# Patient Record
Sex: FEMALE | Race: WHITE
[De-identification: names, ages, dates, MRNs, and addresses within clinical notes are randomized per-mention and may not be internally consistent; named-entity substitution may affect disease eponyms.]

---

## 2018-03-28 ENCOUNTER — HOSPITAL ENCOUNTER (OUTPATIENT)
Dept: HOSPITAL 17 - NEPE | Age: 33
Setting detail: OBSERVATION
LOS: 1 days | Discharge: HOME | End: 2018-03-29
Attending: HOSPITALIST | Admitting: HOSPITALIST
Payer: COMMERCIAL

## 2018-03-28 VITALS
RESPIRATION RATE: 18 BRPM | OXYGEN SATURATION: 100 % | TEMPERATURE: 99.1 F | HEART RATE: 64 BPM | SYSTOLIC BLOOD PRESSURE: 147 MMHG | DIASTOLIC BLOOD PRESSURE: 79 MMHG

## 2018-03-28 VITALS
SYSTOLIC BLOOD PRESSURE: 107 MMHG | OXYGEN SATURATION: 100 % | TEMPERATURE: 98.6 F | DIASTOLIC BLOOD PRESSURE: 66 MMHG | RESPIRATION RATE: 16 BRPM | HEART RATE: 85 BPM

## 2018-03-28 VITALS
SYSTOLIC BLOOD PRESSURE: 122 MMHG | OXYGEN SATURATION: 97 % | RESPIRATION RATE: 20 BRPM | HEART RATE: 82 BPM | TEMPERATURE: 97.9 F | DIASTOLIC BLOOD PRESSURE: 71 MMHG

## 2018-03-28 VITALS
HEART RATE: 68 BPM | TEMPERATURE: 98.3 F | RESPIRATION RATE: 18 BRPM | SYSTOLIC BLOOD PRESSURE: 150 MMHG | DIASTOLIC BLOOD PRESSURE: 87 MMHG | OXYGEN SATURATION: 100 %

## 2018-03-28 VITALS
SYSTOLIC BLOOD PRESSURE: 120 MMHG | OXYGEN SATURATION: 99 % | HEART RATE: 80 BPM | DIASTOLIC BLOOD PRESSURE: 69 MMHG | RESPIRATION RATE: 16 BRPM

## 2018-03-28 VITALS
TEMPERATURE: 97.7 F | RESPIRATION RATE: 18 BRPM | HEART RATE: 60 BPM | OXYGEN SATURATION: 100 % | DIASTOLIC BLOOD PRESSURE: 70 MMHG | SYSTOLIC BLOOD PRESSURE: 143 MMHG

## 2018-03-28 VITALS — WEIGHT: 114.64 LBS | BODY MASS INDEX: 24.06 KG/M2 | HEIGHT: 58 IN

## 2018-03-28 VITALS
DIASTOLIC BLOOD PRESSURE: 65 MMHG | TEMPERATURE: 97.5 F | HEART RATE: 77 BPM | OXYGEN SATURATION: 97 % | RESPIRATION RATE: 16 BRPM | SYSTOLIC BLOOD PRESSURE: 117 MMHG

## 2018-03-28 DIAGNOSIS — K82.8: ICD-10-CM

## 2018-03-28 DIAGNOSIS — K80.12: Primary | ICD-10-CM

## 2018-03-28 DIAGNOSIS — K66.8: ICD-10-CM

## 2018-03-28 LAB
ALBUMIN SERPL-MCNC: 3.9 GM/DL (ref 3.4–5)
ALP SERPL-CCNC: 172 U/L (ref 45–117)
ALT SERPL-CCNC: 34 U/L (ref 10–53)
AST SERPL-CCNC: 23 U/L (ref 15–37)
BACTERIA #/AREA URNS HPF: (no result) /HPF
BASOPHILS # BLD AUTO: 0.2 TH/MM3 (ref 0–0.2)
BASOPHILS NFR BLD: 1 % (ref 0–2)
BILIRUB SERPL-MCNC: 0.2 MG/DL (ref 0.2–1)
BUN SERPL-MCNC: 8 MG/DL (ref 7–18)
CALCIUM SERPL-MCNC: 9.4 MG/DL (ref 8.5–10.1)
CHLORIDE SERPL-SCNC: 103 MEQ/L (ref 98–107)
COLOR UR: YELLOW
CREAT SERPL-MCNC: 0.75 MG/DL (ref 0.5–1)
EOSINOPHIL # BLD: 0.1 TH/MM3 (ref 0–0.4)
EOSINOPHIL NFR BLD: 0.4 % (ref 0–4)
ERYTHROCYTE [DISTWIDTH] IN BLOOD BY AUTOMATED COUNT: 13.1 % (ref 11.6–17.2)
GFR SERPLBLD BASED ON 1.73 SQ M-ARVRAT: 90 ML/MIN (ref 89–?)
GLUCOSE SERPL-MCNC: 99 MG/DL (ref 74–106)
GLUCOSE UR STRIP-MCNC: (no result) MG/DL
HCO3 BLD-SCNC: 27.9 MEQ/L (ref 21–32)
HCT VFR BLD CALC: 40.1 % (ref 35–46)
HGB BLD-MCNC: 13.2 GM/DL (ref 11.6–15.3)
HGB UR QL STRIP: (no result)
KETONES UR STRIP-MCNC: (no result) MG/DL
LYMPHOCYTES # BLD AUTO: 2.1 TH/MM3 (ref 1–4.8)
LYMPHOCYTES NFR BLD AUTO: 13.3 % (ref 9–44)
MCH RBC QN AUTO: 27.7 PG (ref 27–34)
MCHC RBC AUTO-ENTMCNC: 33.1 % (ref 32–36)
MCV RBC AUTO: 83.8 FL (ref 80–100)
MONOCYTE #: 0.5 TH/MM3 (ref 0–0.9)
MONOCYTES NFR BLD: 3.1 % (ref 0–8)
MUCOUS THREADS #/AREA URNS LPF: (no result) /LPF
NEUTROPHILS # BLD AUTO: 13 TH/MM3 (ref 1.8–7.7)
NEUTROPHILS NFR BLD AUTO: 82.2 % (ref 16–70)
NITRITE UR QL STRIP: (no result)
PLATELET # BLD: 260 TH/MM3 (ref 150–450)
PMV BLD AUTO: 9.2 FL (ref 7–11)
PROT SERPL-MCNC: 8.8 GM/DL (ref 6.4–8.2)
RBC # BLD AUTO: 4.78 MIL/MM3 (ref 4–5.3)
SODIUM SERPL-SCNC: 139 MEQ/L (ref 136–145)
SP GR UR STRIP: 1.02 (ref 1–1.03)
SQUAMOUS #/AREA URNS HPF: <1 /HPF (ref 0–5)
URINE LEUKOCYTE ESTERASE: (no result)
WBC # BLD AUTO: 15.8 TH/MM3 (ref 4–11)

## 2018-03-28 PROCEDURE — 96366 THER/PROPH/DIAG IV INF ADDON: CPT

## 2018-03-28 PROCEDURE — 85025 COMPLETE CBC W/AUTO DIFF WBC: CPT

## 2018-03-28 PROCEDURE — 80053 COMPREHEN METABOLIC PANEL: CPT

## 2018-03-28 PROCEDURE — 00790 ANES IPER UPR ABD NOS: CPT

## 2018-03-28 PROCEDURE — 84100 ASSAY OF PHOSPHORUS: CPT

## 2018-03-28 PROCEDURE — 83690 ASSAY OF LIPASE: CPT

## 2018-03-28 PROCEDURE — 96376 TX/PRO/DX INJ SAME DRUG ADON: CPT

## 2018-03-28 PROCEDURE — 99285 EMERGENCY DEPT VISIT HI MDM: CPT

## 2018-03-28 PROCEDURE — 88304 TISSUE EXAM BY PATHOLOGIST: CPT

## 2018-03-28 PROCEDURE — 84439 ASSAY OF FREE THYROXINE: CPT

## 2018-03-28 PROCEDURE — 83036 HEMOGLOBIN GLYCOSYLATED A1C: CPT

## 2018-03-28 PROCEDURE — 76705 ECHO EXAM OF ABDOMEN: CPT

## 2018-03-28 PROCEDURE — 82150 ASSAY OF AMYLASE: CPT

## 2018-03-28 PROCEDURE — A9537 TC99M MEBROFENIN: HCPCS

## 2018-03-28 PROCEDURE — 96361 HYDRATE IV INFUSION ADD-ON: CPT

## 2018-03-28 PROCEDURE — 96365 THER/PROPH/DIAG IV INF INIT: CPT

## 2018-03-28 PROCEDURE — 78226 HEPATOBILIARY SYSTEM IMAGING: CPT

## 2018-03-28 PROCEDURE — 84443 ASSAY THYROID STIM HORMONE: CPT

## 2018-03-28 PROCEDURE — 81001 URINALYSIS AUTO W/SCOPE: CPT

## 2018-03-28 PROCEDURE — 85610 PROTHROMBIN TIME: CPT

## 2018-03-28 PROCEDURE — 83735 ASSAY OF MAGNESIUM: CPT

## 2018-03-28 PROCEDURE — 84703 CHORIONIC GONADOTROPIN ASSAY: CPT

## 2018-03-28 PROCEDURE — 74176 CT ABD & PELVIS W/O CONTRAST: CPT

## 2018-03-28 PROCEDURE — 96375 TX/PRO/DX INJ NEW DRUG ADDON: CPT

## 2018-03-28 PROCEDURE — 47562 LAPAROSCOPIC CHOLECYSTECTOMY: CPT

## 2018-03-28 PROCEDURE — G0378 HOSPITAL OBSERVATION PER HR: HCPCS

## 2018-03-28 RX ADMIN — FAMOTIDINE SCH MG: 10 INJECTION, SOLUTION INTRAVENOUS at 12:53

## 2018-03-28 RX ADMIN — PHENYTOIN SODIUM SCH MLS/HR: 50 INJECTION INTRAMUSCULAR; INTRAVENOUS at 20:41

## 2018-03-28 RX ADMIN — STANDARDIZED SENNA CONCENTRATE AND DOCUSATE SODIUM SCH TAB: 8.6; 5 TABLET, FILM COATED ORAL at 20:46

## 2018-03-28 RX ADMIN — TAZOBACTAM SODIUM AND PIPERACILLIN SODIUM SCH MLS/HR: 375; 3 INJECTION, SOLUTION INTRAVENOUS at 20:40

## 2018-03-28 RX ADMIN — Medication SCH ML: at 09:00

## 2018-03-28 RX ADMIN — Medication SCH ML: at 20:42

## 2018-03-28 RX ADMIN — TAZOBACTAM SODIUM AND PIPERACILLIN SODIUM SCH MLS/HR: 375; 3 INJECTION, SOLUTION INTRAVENOUS at 13:37

## 2018-03-28 RX ADMIN — FAMOTIDINE SCH MG: 10 INJECTION, SOLUTION INTRAVENOUS at 21:36

## 2018-03-28 RX ADMIN — PHENYTOIN SODIUM SCH MLS/HR: 50 INJECTION INTRAMUSCULAR; INTRAVENOUS at 07:17

## 2018-03-28 NOTE — HHI.HP
__________________________________________________





[facility]


Service


[facility]


Primary Care Physician


No Primary Care Physician


Admission Diagnosis





Acute Cholecystitis


Diagnoses:  


(1) Cholelithiasis


Diagnosis:  Principal





Chief Complaint:  


Abdominal pain


Travel History


International Travel<30 Days:  No


Contact w/Intl Traveler <30 Da:  No


Traveled to Known Affected Are:  No


History of Present Illness


Patient is a 32-year-old female who presented to the emergency department with 

abdominal pain.  Patient states that she has a history of gallstones and prior 

biliary colic diagnosed via ultrasound.  Patient is having worsening right 

upper quadrant pain.  Which is referring to the epigastric area.  She has some 

sharp pain that comes and goes has had some nausea with it but no vomiting


Patient has poor oral intake.


Denies any urinary symptoms.


Denies any fever chills or sweats








We will consult gastroenterology and surgery and placed in observation





Review of Systems


Constitutional:  COMPLAINS OF: Change in appetite, DENIES: Diaphoretic episodes

, Fatigue, Fever, Weight gain, Weight loss, Chills, Dizziness, Night Sweats


Endocrine:  DENIES: Abnorml menstrual pattern, Heat/cold intolerance, Polydipsia

, Polyuria, Polyphagia


Eyes:  DENIES: Blurred vision, Diplopia, Eye inflammation, Eye pain, Vision loss

, Photosensitivity, Double Vision


Ears, nose, mouth, throat:  DENIES: Tinnitus, Hearing loss, Vertigo, Nasal 

discharge, Oral lesions, Throat pain, Hoarseness, Ear Pain, Running Nose, 

Epistaxis, Sinus Pain, Toothache, Odynophagia


Respiratory:  DENIES: Apneas, Cough, Snoring, Wheezing, Hemoptysis, Sputum 

production, Shortness of breath


Cardiovascular:  DENIES: Chest pain, Palpitations, Syncope, Dyspnea on Exertion

, PND, Lower Extremity Edema, Orthopnea, Claudication


Gastrointestinal:  COMPLAINS OF: Abdominal pain, Nausea, DENIES: Black stools, 

Bloody stools, Constipation, Diarrhea, Vomiting, Difficulty Swallowing, Anorexia


Genitourinary:  DENIES: Abnormal vaginal bleeding, Dysmenorrhea, Dyspareunia


Musculoskeletal:  DENIES: Joint pain, Muscle aches, Stiffness, Joint Swelling, 

Back pain, Neck pain


Integumentary:  DENIES: Abnormal pigmentation, Pruritus, Rash, Nail changes, 

Breast masses, Breast skin changes, Nipple discharge


Hematologic/lymphatic:  DENIES: Bruising, Lymphadenopathy


Immunologic/allergic:  DENIES: Eczema, Urticaria


Neurologic:  DENIES: Abnormal gait, Headache, Localized weakness, Paresthesias, 

Seizures, Speech Problems, Tremor, Poor Balance


Psychiatric:  DENIES: Anxiety, Confusion, Mood changes, Depression, 

Hallucinations, Agitation, Suicidal Ideation, Homicidal Ideation, Delusions


Except as stated in HPI:  all other systems reviewed are Neg





Past Family Social History


Past Medical History


Has had issues with gallbladder before


 section 2


Past Surgical History


 section 2


Reported Medications





Reported Meds & Active Scripts


Active


Reported


Tri-Sprintec (Norgestimate-Ethinyl Estradiol) 0.18/0.215/0.25 Mg-35 Mcg Tab


Allergies:  


Coded Allergies:  


     erythromycin base (Unverified  Allergy, Mild, 3/28/18)


Active Ordered Medications





Current Medications


Morphine Sulfate (Morphine Inj) 2 mg ONCE  ONCE IV PUSH  Last administered on 3/

28/18at 06:03;  Start 3/28/18 at 05:45;  Stop 3/28/18 at 05:46;  Status DC


Ondansetron HCl (Zofran Inj) 4 mg ONCE  ONCE IVP  Last administered on 3/28/

18at 06:02;  Start 3/28/18 at 05:45;  Stop 3/28/18 at 05:46;  Status DC


Sodium Chloride 1,000 ml @  1,000 mls/hr Q1H IV  Last administered on 3/28/18at 

05:42;  Start 3/28/18 at 05:42;  Stop 3/28/18 at 06:41;  Status DC


Sodium Chloride (NS Flush) 2 ml UNSCH  PRN IV FLUSH FLUSH AFTER USING IV ACCESS

;  Start 3/28/18 at 05:45


Piperacillin Sod/ Tazobactam Sod 50 ml @  100 mls/hr ONCE  ONCE IV  Last 

administered on 3/28/18at 06:41;  Start 3/28/18 at 06:15;  Stop 3/28/18 at 06:44

;  Status DC


Sodium Chloride 1,000 ml @  100 mls/hr Q10H IV  Last administered on 3/28/18at 

07:17;  Start 3/28/18 at 06:45


Sodium Chloride (NS Flush) 2 ml UNSCH  PRN IV FLUSH FLUSH AFTER USING IV ACCESS

;  Start 3/28/18 at 06:45


Sodium Chloride (NS Flush) 2 ml BID IV FLUSH ;  Start 3/28/18 at 09:00


Ondansetron HCl (Zofran Inj) 4 mg Q6H  PRN IVP NAUSEA OR VOMITING;  Start 3/28/

18 at 12:00


Naloxone HCl (Narcan Inj) 0.4 mg UNSCH  PRN IV PUSH SEE LABEL COMMENTS;  Start 3

/28/18 at 06:45


Piperacillin Sod/ Tazobactam Sod 50 ml @  100 mls/hr Q6H IV ;  Start 3/28/18 at 

13:00


Morphine Sulfate (Morphine Inj) 2 mg Q3H  PRN IV PUSH pain > 4;  Start 3/28/18 

at 09:00


Family History


Father has history of heart disease.


Mother has history of hypertension


Social History


Works at [employer] in their [employer]


Denies any alcohol denies any tobacco denies any illicit





Physical Exam


Vital Signs





Vital Signs








  Date Time  Temp Pulse Resp B/P (MAP) Pulse Ox O2 Delivery O2 Flow Rate FiO2


 


3/28/18 07:17  80 16 120/69 (86) 99 Room Air  


 


3/28/18 05:37 98.3 68 18 150/87 (108) 100 Room Air  


 


3/28/18 03:12 98.6 85 16 107/66 (80) 100   








Physical Exam


GENERAL: This is a well-nourished, well-developed patient, in no apparent 

distress.


SKIN: No rashes, ecchymoses or lesions. Cool and dry.


HEAD: Atraumatic. Normocephalic. No temporal or scalp tenderness.


EYES: Pupils equal round and reactive. Extraocular motions intact. No scleral 

icterus. No injection or drainage. 


ENT: Nose without bleeding, purulent drainage or septal hematoma. Throat 

without erythema, tonsillar hypertrophy or exudate. Uvula midline. Airway 

patent.


NECK: Trachea midline. No JVD or lymphadenopathy. Supple, nontender, no 

meningeal signs.


CARDIOVASCULAR: Regular rate and rhythm without murmurs, gallops, or rubs.  S1-

S2 no S3 or S4


RESPIRATORY: Clear to auscultation. Breath sounds equal bilaterally. No wheezes

, rales, or rhonchi.  


GASTROINTESTINAL: Abdomen  nondistended. No hepato-splenomegaly, or palpable 

masses. No guarding.  Some tenderness in the right upper quadrant right side of 

the abdomen


MUSCULOSKELETAL: Extremities without clubbing, cyanosis, or edema. No joint 

tenderness, effusion, or edema noted. No calf tenderness. Negative Homans sign 

bilaterally.


NEUROLOGICAL: Awake and alert. Cranial nerves II through XII intact.  Motor and 

sensory grossly within normal limits. Five out of 5 muscle strength in all 

muscle groups.  Normal speech.


Insight and judgment is good


Mood and behaviors appropriate


Laboratory





Laboratory Tests








Test


  3/28/18


05:50


 


White Blood Count 15.8 


 


Red Blood Count 4.78 


 


Hemoglobin 13.2 


 


Hematocrit 40.1 


 


Mean Corpuscular Volume 83.8 


 


Mean Corpuscular Hemoglobin 27.7 


 


Mean Corpuscular Hemoglobin


Concent 33.1 


 


 


Red Cell Distribution Width 13.1 


 


Platelet Count 260 


 


Mean Platelet Volume 9.2 


 


Neutrophils (%) (Auto) 82.2 


 


Lymphocytes (%) (Auto) 13.3 


 


Monocytes (%) (Auto) 3.1 


 


Eosinophils (%) (Auto) 0.4 


 


Basophils (%) (Auto) 1.0 


 


Neutrophils # (Auto) 13.0 


 


Lymphocytes # (Auto) 2.1 


 


Monocytes # (Auto) 0.5 


 


Eosinophils # (Auto) 0.1 


 


Basophils # (Auto) 0.2 


 


CBC Comment DIFF FINAL 


 


Differential Comment  


 


Urine Color YELLOW 


 


Urine Turbidity CLEAR 


 


Urine pH 7.5 


 


Urine Specific Gravity 1.023 


 


Urine Protein TRACE 


 


Urine Glucose (UA) NEG 


 


Urine Ketones NEG 


 


Urine Occult Blood NEG 


 


Urine Nitrite NEG 


 


Urine Bilirubin NEG 


 


Urine Urobilinogen LESS THAN 2.0 


 


Urine Leukocyte Esterase NEG 


 


Urine WBC 2 


 


Urine Squamous Epithelial


Cells <1 


 


 


Urine Bacteria RARE 


 


Urine Mucus FEW 


 


Microscopic Urinalysis Comment


  CULT NOT


INDICATED


 


Blood Urea Nitrogen 8 


 


Creatinine 0.75 


 


Random Glucose 99 


 


Total Protein 8.8 


 


Albumin 3.9 


 


Calcium Level 9.4 


 


Alkaline Phosphatase 172 


 


Aspartate Amino Transf


(AST/SGOT) 23 


 


 


Alanine Aminotransferase


(ALT/SGPT) 34 


 


 


Total Bilirubin 0.2 


 


Sodium Level 139 


 


Potassium Level 3.7 


 


Chloride Level 103 


 


Carbon Dioxide Level 27.9 


 


Anion Gap 8 


 


Estimat Glomerular Filtration


Rate 90 


 


 


Lipase 127 








Result Diagram:  


3/28/18 0550                                                                   

             3/28/18 0550





Imaging





Last Impressions








Abdomen/Pelvis CT 3/28/18 0542 Signed





Impressions: 





 Service Date/Time:  2018 06:20 - CONCLUSION:  1. No renal 





 calculi or hydronephrosis. 2. Normal appendix. 3. Cholelithiasis.     Adalberto Neri MD 


 


Gall Bladder Ultrasound 3/28/18 0000 Signed





Impressions: 





 Service Date/Time:  2018 07:39 - CONCLUSION:  1. Sludge 

and 





 gallstone lodged within the neck of the gallbladder with sonographic findings 





 suggesting acute cholecystitis. HIDA scan could be performed to evaluate the 





 patency of the cystic duct. 2. Common bile duct is long-term stable in size.  

   





 Thomas Green Jr., MD Caprini VTE Risk Assessment


Caprini VTE Risk Assessment:  No/Low Risk (score <= 1)


Caprini Risk Assessment Model











 Point Value = 1          Point Value = 2  Point Value = 3  Point Value = 5


 


Age 41-60


Minor surgery


BMI > 25 kg/m2


Swollen legs


Varicose veins


Pregnancy or postpartum


History of unexplained or recurrent


   spontaneous 


Oral contraceptives or hormone


   replacement


Sepsis (< 1 month)


Serious lung disease, including


   pneumonia (< 1 month)


Abnormal pulmonary function


Acute myocardial infarction


Congestive heart failure (< 1 month)


History of inflammatory bowel disease


Medical patient at bed rest Age 61-74


Arthroscopic surgery


Major open surgery (> 45 min)


Laparoscopic surgery (> 45 min)


Malignancy


Confined to bed (> 72 hours)


Immobilizing plaster cast


Central venous access Age >= 75


History of VTE


Family history of VTE


Factor V Leiden


Prothrombin 22319K


Lupus anticoagulant


Anticardiolipin antibodies


Elevated serum homocysteine


Heparin-induced thrombocytopenia


Other congenital or acquired


   thrombophilia Stroke (< 1 month)


Elective arthroplasty


Hip, pelvis, or leg fracture


Acute spinal cord injury (< 1 month)








Prophylaxis Regimen











   Total Risk


Factor Score Risk Level Prophylaxis Regimen


 


0-1      Low Early ambulation


 


2 Moderate Order ONE of the following:


*Sequential Compression Device (SCD)


*Heparin 5000 units SQ BID


 


3-4 Higher Order ONE of the following medications:


*Heparin 5000 units SQ TID


*Enoxaparin/Lovenox 40 mg SQ daily (WT < 150 kg, CrCl > 30 mL/min)


*Enoxaparin/Lovenox 30 mg SQ daily (WT < 150 kg, CrCl > 10-29 mL/min)


*Enoxaparin/Lovenox 30 mg SQ BID (WT < 150 kg, CrCl > 30 mL/min)


AND/OR


*Sequential Compression Device (SCD)


 


5 or more Highest Order ONE of the following medications:


*Heparin 5000 units SQ TID (Preferred with Epidurals)


*Enoxaparin/Lovenox 40 mg SQ daily (WT < 150 kg, CrCl > 30 mL/min)


*Enoxaparin/Lovenox 30 mg SQ daily (WT < 150 kg, CrCl > 10-29 mL/min)


*Enoxaparin/Lovenox 30 mg SQ BID (WT < 150 kg, CrCl > 30 mL/min)


AND


*Sequential Compression Device (SCD)











Assessment and Plan


Assessment and Plan


Abdominal pain with possible acute cholecystitis will consult surgery as well 

as gastroenterology


We will get a HIDA scan as well as a MRCP


Pain control with nonnarcotics due to need for HIDA scan





Suspected cholecystitis pain control discussed with gastroenterology





Leukocytosis continue on antibiotics and pain control and fluids keep n.p.o. at 

this time


Continue on DVT prophylaxis with SCDs at this time


GI prophylaxis with Pepcid


Code Status


full code


Discussed Condition With


[facility] AND RN AND PT











Momo Mireles DO Mar 28, 2018 09:47

## 2018-03-28 NOTE — PD
HPI


.


Abdominal pain


Chief Complaint:  Abdominal Pain


Time Seen by Provider:  05:36


Travel History


International Travel<30 days:  No


Contact w/Intl Traveler<30days:  No


Traveled to known affect area:  No





History of Present Illness


HPI


32-year-old female history of gallstones and prior biliary colic diagnosed via 

ultrasound, presents with having worsening of right upper quadrant pain Which 

spread to epigastric area currently, he noted as sharp pain that is unremitting 

and nonradiating.  Patient has associated nausea no vomiting.  Patient has poor 

tolerance of p.o. denies any fever chills sweats





PFSH


Past Medical History


*** Narrative Medical


Past medical history reviewed.  See HPI


Gastrointestinal Disorders:  Yes (gallstones)


Genitourinary:  Yes


Tetanus Vaccination:  Unknown


Influenza Vaccination:  No


Pregnant?:  Unknown


LMP:  2018


:  3


Para:  2


:  1





Past Surgical History


 Section:  Yes





Social History


Alcohol Use:  No


Tobacco Use:  No





Allergies-Medications


(Allergen,Severity, Reaction):  


Coded Allergies:  


     erythromycin base (Unverified  Allergy, Mild, 3/28/18)


Reported Meds & Prescriptions





Reported Meds & Active Scripts


Active


Reported


Tri-Sprintec (Norgestimate-Ethinyl Estradiol) 0.18/0.215/0.25 Mg-35 Mcg Tab   





Narrative Medication


Allergies and medications reviewed





Review of Systems


Except as stated in HPI:  all other systems reviewed are Neg


General / Constitutional:  No: Fever


Eyes:  No: Visual changes


HENT:  No: Headaches


Cardiovascular:  No: Chest Pain or Discomfort


Respiratory:  No: Shortness of Breath


Gastrointestinal:  Positive: Nausea, Abdominal Pain, Loss of Appetite, No: 

Vomiting, Hematemesis, Hematochezia, Constipation, Dysphagia


Genitourinary:  No: Urgency, Frequency, Dysuria, Hematuria


Musculoskeletal:  No: Pain


Skin:  No Rash


Neurologic:  No: Weakness


Psychiatric:  No: Depression


Endocrine:  No: Polydipsia


Hematologic/Lymphatic:  No: Easy Bruising





Physical Exam


Narrative


GENERAL: Awake and alert oriented 3 no acute distress.  Vital signs afebrile 

normal and stable


SKIN: Warm and dry.  Color is slightly sallow, no diaphoresis cyanosis or pallor


HEAD: Atraumatic. Normocephalic. 


EYES: Pupils equal and round. No scleral icterus. No injection or drainage. 


ENT: No nasal bleeding or discharge.  Mucous membranes pink and moist.


NECK: Trachea midline. No JVD.  Supple full range of


CARDIOVASCULAR: Regular rate and rhythm.  S1-S2 no murmurs


RESPIRATORY: No accessory muscle use. Clear to auscultation. Breath sounds 

equal bilaterally. 


GASTROINTESTINAL: Abdomen soft, tender epigastrium and right upper quadrant, 

equivocal to positive Amos sign., nondistended. Hepatic and splenic margins 

not palpable. 


MUSCULOSKELETAL: Extremities without clubbing, cyanosis, or edema. No obvious 

deformities. 


NEUROLOGICAL: Awake and alert. No obvious cranial nerve deficits.  Motor 

grossly within normal limits. Five out of 5 muscle strength in the arms and 

legs.  Normal speech.


PSYCHIATRIC: Appropriate mood and affect; insight and judgment normal.





Data


Data


Last Documented VS





Vital Signs








  Date Time  Temp Pulse Resp B/P (MAP) Pulse Ox O2 Delivery O2 Flow Rate FiO2


 


3/28/18 05:37 98.3 68 18 150/87 (108) 100 Room Air  








Orders





 Orders


Complete Blood Count With Diff (3/28/18 05:42)


Comprehensive Metabolic Panel (3/28/18 05:42)


Lipase (3/28/18 05:42)


Urinalysis - C+S If Indicated (3/28/18 05:42)


Ct Abd/Pel W/O Iv Contrast (3/28/18 05:42)


Iv Access Insert/Monitor (3/28/18 05:42)


Ecg Monitoring (3/28/18 05:42)


Oximetry (3/28/18 05:42)


Morphine Inj (Morphine Inj) (3/28/18 05:45)


Ondansetron Inj (Zofran Inj) (3/28/18 05:45)


Sodium Chlor 0.9% 1000 Ml Inj (Ns 1000 M (3/28/18 05:42)


Sodium Chloride 0.9% Flush (Ns Flush) (3/28/18 05:45)


Ed Urine Pregnancytest Poc (3/28/18 05:42)


Piperacil-Tazo 3.375 Gm Premix (Zosyn 3. (3/28/18 06:15)


Us Abdomen Gallbladder (3/28/18 )


Admit Order (Ed Use Only) (3/28/18 06:48)


Place In Observation (3/28/18 )


Vital Signs (Adult) Q4H (3/28/18 06:45)


Activity Oob With Assistance (3/28/18 06:45)


Intake + Output SAMANTHA.QSHIFT (3/28/18 06:45)


Diet Npo (3/28/18 Breakfast)


Sodium Chlor 0.9% 1000 Ml Inj (Ns 1000 M (3/28/18 06:45)


Sodium Chloride 0.9% Flush (Ns Flush) (3/28/18 06:45)


Sodium Chloride 0.9% Flush (Ns Flush) (3/28/18 09:00)


Ondansetron Inj (Zofran Inj) (3/28/18 12:00)


Comprehensive Metabolic Panel (3/29/18 06:00)


Complete Blood Count With Diff (3/29/18 06:00)


Naloxone Inj (Narcan Inj) (3/28/18 06:45)


Piperacil-Tazo 3.375 Gm Premix (Zosyn 3. (3/28/18 13:00)


Morphine Inj (Morphine Inj) (3/28/18 09:00)





Labs





Laboratory Tests








Test


  3/28/18


05:50


 


White Blood Count 15.8 TH/MM3 


 


Red Blood Count 4.78 MIL/MM3 


 


Hemoglobin 13.2 GM/DL 


 


Hematocrit 40.1 % 


 


Mean Corpuscular Volume 83.8 FL 


 


Mean Corpuscular Hemoglobin 27.7 PG 


 


Mean Corpuscular Hemoglobin


Concent 33.1 % 


 


 


Red Cell Distribution Width 13.1 % 


 


Platelet Count 260 TH/MM3 


 


Mean Platelet Volume 9.2 FL 


 


Neutrophils (%) (Auto) 82.2 % 


 


Lymphocytes (%) (Auto) 13.3 % 


 


Monocytes (%) (Auto) 3.1 % 


 


Eosinophils (%) (Auto) 0.4 % 


 


Basophils (%) (Auto) 1.0 % 


 


Neutrophils # (Auto) 13.0 TH/MM3 


 


Lymphocytes # (Auto) 2.1 TH/MM3 


 


Monocytes # (Auto) 0.5 TH/MM3 


 


Eosinophils # (Auto) 0.1 TH/MM3 


 


Basophils # (Auto) 0.2 TH/MM3 


 


CBC Comment DIFF FINAL 


 


Differential Comment  


 


Urine Color YELLOW 


 


Urine Turbidity CLEAR 


 


Urine pH 7.5 


 


Urine Specific Gravity 1.023 


 


Urine Protein TRACE mg/dL 


 


Urine Glucose (UA) NEG mg/dL 


 


Urine Ketones NEG mg/dL 


 


Urine Occult Blood NEG 


 


Urine Nitrite NEG 


 


Urine Bilirubin NEG 


 


Urine Urobilinogen


  LESS THAN 2.0


MG/DL


 


Urine Leukocyte Esterase NEG 


 


Urine WBC 2 /hpf 


 


Urine Squamous Epithelial


Cells <1 /hpf 


 


 


Urine Bacteria RARE /hpf 


 


Urine Mucus FEW /lpf 


 


Microscopic Urinalysis Comment


  CULT NOT


INDICATED


 


Blood Urea Nitrogen 8 MG/DL 


 


Creatinine 0.75 MG/DL 


 


Random Glucose 99 MG/DL 


 


Total Protein 8.8 GM/DL 


 


Albumin 3.9 GM/DL 


 


Calcium Level 9.4 MG/DL 


 


Alkaline Phosphatase 172 U/L 


 


Aspartate Amino Transf


(AST/SGOT) 23 U/L 


 


 


Alanine Aminotransferase


(ALT/SGPT) 34 U/L 


 


 


Total Bilirubin 0.2 MG/DL 


 


Sodium Level 139 MEQ/L 


 


Potassium Level 3.7 MEQ/L 


 


Chloride Level 103 MEQ/L 


 


Carbon Dioxide Level 27.9 MEQ/L 


 


Anion Gap 8 MEQ/L 


 


Estimat Glomerular Filtration


Rate 90 ML/MIN 


 


 


Lipase 127 U/L 











MDM


Medical Decision Making


Medical Screen Exam Complete:  Yes


Emergency Medical Condition:  Yes


Medical Record Reviewed:  Yes


Differential Diagnosis


Biliary colic, cholecystitis, choledocholithiasis, gastritis, duodenitis


Narrative Course


Patient has elevated white blood cell count.  CT reveals thickened wall 

gallbladder with pericholecystic fluid consistent with possible cholecystitis.


Ultrasound ordered for evaluation of common bile duct.  Patient admitted to 

medicine service with pain medications and broad-spectrum antibiotics 

administered.





Diagnosis





 Primary Impression:  


 Acute cholecystitis





Admitting Information


Admitting Physician Requests:  Admit











Kashmir Curtis MD Mar 28, 2018 06:05

## 2018-03-28 NOTE — RADRPT
EXAM DATE/TIME:  2018 07:39 

 

[facility] COMPARISON:     

US ABDOMEN - GALLBLADDER, 2016, 8:50.

        

 

 

INDICATIONS :     

Evaluate common bile duct. 

                     

 

MEDICAL HISTORY :           

Gallstones. 

 

SURGICAL HISTORY :     

 section.     

 

ENCOUNTER:     

Initial

 

ACUITY:     

2 days

 

PAIN SCORE:     

7/10

 

LOCATION:     

Right upper quadrant 

                     

MEASUREMENTS:     

 

LIVER:     

16.2 cm length 

 

COMMON DUCT:     

5 mm

 

RIGHT KIDNEY:     

10.4 x 5.5 x 4.4 cm

 

FINDINGS:     

 

LIVER:     

Normal echotexture without focal lesion or ductal dilatation.  

 

COMMON DUCT:     

No intraluminal mass or stone visualized. Stable in size.  

 

GALLBLADDER:          

The gallbladder contains a 14 mm calcified stone which is lodged in the neck of the gallbladder. Ther
e is sludge also seen throughout the gallbladder. Gallbladder wall thickening measuring 3 mm with a t
race amount of pericholecystic fluid.

 

PANCREAS:          

The visualized portions are within normal limits.  

 

RIGHT KIDNEY:          

No evidence of hydronephrosis, stone, or mass.  

 

CONCLUSION:     

1. Sludge and gallstone lodged within the neck of the gallbladder with sonographic findings suggestin
g acute cholecystitis. HIDA scan could be performed to evaluate the patency of the cystic duct.

2. Common bile duct is long-term stable in size.

 

 

 

 Benjamin Jones Jr., MD on 2018 at 8:27           

Board Certified Radiologist.

 This report was verified electronically.

## 2018-03-28 NOTE — HHI.PR
__________________________________________________





Immediate Post Op Note


Procedure Date:


Mar 28, 2018


Pre Op Diagnosis:  


acute regi with gb stones


Post Op Diagnosis:  


same


Surgeon:


Adan Sow MD


Assistant(s):


see or sheet


Procedure:


lap regi


Findings:


very inflamed gb, stones


Complications:


none


Specimen(s) removed:


gallbladder


Estimated blood loss:


5cc


Anesthesia:  General


Patient to:  PACU


Patient Condition:  Good











Adan Sow MD Mar 28, 2018 15:36

## 2018-03-28 NOTE — RADRPT
EXAM DATE/TIME:  2018 06:20 

 

[facility] COMPARISON:     

No previous studies available for comparison.

 

 

INDICATIONS :     

Right side abdominal pain. 

                  

 

ORAL CONTRAST:      

No oral contrast ingested.

                  

 

RADIATION DOSE:     

7.24 CTDIvol (mGy) 

 

 

MEDICAL HISTORY :       

Gallstones.

 

SURGICAL HISTORY :      

 section. 

 

ENCOUNTER:      

Initial

 

ACUITY:      

1 day

 

PAIN SCALE:      

6/10

 

LOCATION:       

Right  abdomen. 

 

TECHNIQUE:     

Volumetric scanning of the abdomen and pelvis was performed.  Using automated exposure control and ad
justment of the mA and/or kV according to patient size, radiation dose was kept as low as reasonably 
achievable to obtain optimal diagnostic quality images.  DICOM format image data is available electro
nically for review and comparison.  

 

FINDINGS:     

 

LOWER LUNGS:     

The visualized lower lungs are clear.

 

LIVER:     

Homogeneous density without lesion.  There is no dilation of the biliary tree. There are calcified ga
llstones.

 

SPLEEN:     

Normal size without lesion.

 

PANCREAS:     

Within normal limits. 

 

KIDNEYS:     

Normal in size and shape.  There is no mass, stone, or hydronephrosis.

 

ADRENAL GLANDS:     

Within normal limits.

 

VASCULAR:     

There is no aortic aneurysm.

 

BOWEL/MESENTERY:     

The stomach, small bowel, and colon demonstrate no acute abnormality.  There is no free intraperitone
al air or fluid. Normal appendix.

 

ABDOMINAL WALL:     

Within normal limits.

 

RETROPERITONEUM:     

There is no lymphadenopathy.

 

BLADDER:     

No wall thickening or mass.

 

REPRODUCTIVE:     

Within normal limits.

 

INGUINAL:     

There is no lymphadenopathy or hernia.

 

MUSCULOSKELETAL:     

Within normal limits for patient age.

 

CONCLUSION:     

1. No renal calculi or hydronephrosis.

2. Normal appendix.

3. Cholelithiasis.

 

 

 

 Adalberto Neri MD on 2018 at 6:34           

Board Certified Radiologist.

 This report was verified electronically.

## 2018-03-28 NOTE — PD.CONS
HPI


History of Present Illness


This is a 32 year old F with no significant PMH, states she has had issues with 

her gallbladder for the past year.  Was previously advised to have 

cholecystectomy, however timing was bad so she did not have it done. Pt 

presented to the ER early this morning with complaints of RUQ abdominal pain 

radiating to her epigastric area that began suddenly at 10pm last night. 

Described pain as dull and achy. States pain is worse with larger meals. 

Associated nausea, denies emesis.  Reports occasional ETOH. Denies smoking and 

illicit drug use. Work up so far includes Gallbladder US and CT abdomen and 

pelvis. HIDA scan ordered per Dr. Lopes. 


 (Sindi Claudio)





[facility]


Past Medical History


Denies


Past Surgical History


C- section


 (Sindi Claudio)


Coded Allergies:  


     erythromycin base (Unverified  Allergy, Mild, 3/28/18)


Social History


Occasional ETOH


Denies smoking


Denies illicit drugs 


 (Sindi Claudio)





Review of Systems


Gastrointestinal:  COMPLAINS OF: Abdominal pain, Nausea, DENIES: Black stools, 

Bloody stools, Constipation, Diarrhea, Vomiting, Difficulty Swallowing, Anorexia

, Odynophagia, Swelling of Abdomen, Heartburn, Hematemesis (Sindi Claudio

)





GI Exam


Vitals I&O





Vital Signs








  Date Time  Temp Pulse Resp B/P (MAP) Pulse Ox O2 Delivery O2 Flow Rate FiO2


 


3/28/18 07:17  80 16 120/69 (86) 99 Room Air  


 


3/28/18 05:37 98.3 68 18 150/87 (108) 100 Room Air  


 


3/28/18 03:12 98.6 85 16 107/66 (80) 100   














I/O      


 


 3/27/18 3/27/18 3/27/18 3/28/18 3/28/18 3/28/18





 07:00 15:00 23:00 07:00 15:00 23:00


 


Intake Total     1100 ml 


 


Balance     1100 ml 


 


      


 


Intake IV Total     1100 ml 








Imaging





Last Impressions








Abdomen/Pelvis CT 3/28/18 0542 Signed





Impressions: 





 Service Date/Time:  Wednesday, March 28, 2018 06:20 - CONCLUSION:  1. No renal 





 calculi or hydronephrosis. 2. Normal appendix. 3. Cholelithiasis.     Adalberto Neri MD 


 


Gall Bladder Ultrasound 3/28/18 0000 Signed





Impressions: 





 Service Date/Time:  Wednesday, March 28, 2018 07:39 - CONCLUSION:  1. Sludge 

and 





 gallstone lodged within the neck of the gallbladder with sonographic findings 





 suggesting acute cholecystitis. HIDA scan could be performed to evaluate the 





 patency of the cystic duct. 2. Common bile duct is long-term stable in size.  

   





 Benjamin Jones Jr., MD 








Laboratory











Test


  3/28/18


05:50


 


White Blood Count 15.8 TH/MM3 


 


Red Blood Count 4.78 MIL/MM3 


 


Hemoglobin 13.2 GM/DL 


 


Hematocrit 40.1 % 


 


Mean Corpuscular Volume 83.8 FL 


 


Mean Corpuscular Hemoglobin 27.7 PG 


 


Mean Corpuscular Hemoglobin


Concent 33.1 % 


 


 


Red Cell Distribution Width 13.1 % 


 


Platelet Count 260 TH/MM3 


 


Mean Platelet Volume 9.2 FL 


 


Neutrophils (%) (Auto) 82.2 % 


 


Lymphocytes (%) (Auto) 13.3 % 


 


Monocytes (%) (Auto) 3.1 % 


 


Eosinophils (%) (Auto) 0.4 % 


 


Basophils (%) (Auto) 1.0 % 


 


Neutrophils # (Auto) 13.0 TH/MM3 


 


Lymphocytes # (Auto) 2.1 TH/MM3 


 


Monocytes # (Auto) 0.5 TH/MM3 


 


Eosinophils # (Auto) 0.1 TH/MM3 


 


Basophils # (Auto) 0.2 TH/MM3 


 


CBC Comment DIFF FINAL 


 


Differential Comment  


 


Urine Color YELLOW 


 


Urine Turbidity CLEAR 


 


Urine pH 7.5 


 


Urine Specific Gravity 1.023 


 


Urine Protein TRACE mg/dL 


 


Urine Glucose (UA) NEG mg/dL 


 


Urine Ketones NEG mg/dL 


 


Urine Occult Blood NEG 


 


Urine Nitrite NEG 


 


Urine Bilirubin NEG 


 


Urine Urobilinogen


  LESS THAN 2.0


MG/DL


 


Urine Leukocyte Esterase NEG 


 


Urine WBC 2 /hpf 


 


Urine Squamous Epithelial


Cells <1 /hpf 


 


 


Urine Bacteria RARE /hpf 


 


Urine Mucus FEW /lpf 


 


Microscopic Urinalysis Comment


  CULT NOT


INDICATED


 


Blood Urea Nitrogen 8 MG/DL 


 


Creatinine 0.75 MG/DL 


 


Random Glucose 99 MG/DL 


 


Total Protein 8.8 GM/DL 


 


Albumin 3.9 GM/DL 


 


Calcium Level 9.4 MG/DL 


 


Alkaline Phosphatase 172 U/L 


 


Aspartate Amino Transf


(AST/SGOT) 23 U/L 


 


 


Alanine Aminotransferase


(ALT/SGPT) 34 U/L 


 


 


Total Bilirubin 0.2 MG/DL 


 


Sodium Level 139 MEQ/L 


 


Potassium Level 3.7 MEQ/L 


 


Chloride Level 103 MEQ/L 


 


Carbon Dioxide Level 27.9 MEQ/L 


 


Anion Gap 8 MEQ/L 


 


Estimat Glomerular Filtration


Rate 90 ML/MIN 


 


 


Lipase 127 U/L 








Physical Examination


HEENT: Normocephalic; atraumatic


CHEST:  Even/unlabored 


CARDIAC:  RRR


ABDOMEN:  Soft, nondistended, (+) Amos sign;bowel sounds active


EXTREMITIES: No clubbing, cyanosis, or edema.


SKIN:  Normal; no rash; no jaundice.


CNS:  No focal deficits; alert and oriented times three.


 (Sindi Claudio)





Assessment and Plan


Plan


Assessment:


- Acute cholecystitis- US gallbladder --> Sludge and gallstone lodged within


  the neck of the gallbladder with sonographic findings suggesting acute


  cholecystitis. Common bile duct is long-term stable in size.


  CT abdomen and pelvis W/O IV contrast --> Cholelithiasis. No dilation of the


  biliary tree. 


  (+) Amos sign. Pt complaining of RUQ/epigastric pain that began


  suddenly at 10pm last night. 


  Leukocytosis


  AST-23 ALT-34 Alk phos-172 T bili-0.2


  Occasional ETOH. 





Plan:


HIDA scan ordered per attending


GS consulted


Unlikely CBD stone given normal LFTs, can do MRCP at later time if warranted


Monitor LFTs


Further recommendations based on findings of above and clinical course





Pt has been seen and examined by myself and Dr. Hammond and this note is 

written on his behalf 


 (Sindi Claudio)


Physician Comments


Patient seen and examined


Agree with above


Continue with current supportive care


Monitor labs


Further recommendations as per the general surgery service


Not much to add from a GI perspective we will sign off


 (Francisco Hammond MD)











Sindi Claudio Mar 28, 2018 09:55


Francisco Hammond MD Mar 28, 2018 21:47

## 2018-03-28 NOTE — MP
cc:

Adan Sow MD, Lars S MD

****

 

 

DATE OF OPERATION:

03/28/2018

 

PREOPERATIVE DIAGNOSIS:

Acute cholecystitis with cholelithiasis.

 

POSTOPERATIVE DIAGNOSIS:

Acute cholecystitis with cholelithiasis.

 

PROCEDURE PERFORMED:

Laparoscopic cholecystectomy.

 

SURGEON:

Adan Sow MD

 

ASSISTANT:

See OR sheet.

 

ANESTHESIA:

GETA.

 

IV FLUIDS:

See anesthesia sheet.

 

ESTIMATED BLOOD LOSS:

5 mL.

 

DRAINS:

None.

 

COMPLICATIONS:

None.

 

WOUND CLASSIFICATION:

Contaminated.

 

SPECIMENS:

Gallbladder.

 

INDICATIONS:

The patient is a 32-year-old female who has a history of 

cholelithiasis.  She came with acute onset of pain since 10:00 p.m., 

right upper quadrant, and CT and ultrasound findings of acute 

cholecystitis with cholelithiasis. Therefore, decision was made for 

operative intervention.

 

DETAILS OF PROCEDURE:

The patient was taken to the operative suite, placed in supine 

position.  She was prepped and draped in usual sterile fashion after 

induction of general endotracheal anesthesia.  Brief timeout done 

stating correct patient, procedure, and surgical site.  We were all in

agreement with this.

 

Attention was directed to the umbilicus where a local anesthetic was 

injected.  A stab nick incision was made with an 11 blade.  The Veress

needle was placed.  Intraabdominal placement confirmed with saline 

drop test.  The abdomen insufflated to 15 mm pneumoperitoneum.  The 5 

mm Visiport Optiview was used to enter the abdomen safely.  On cursory

inspection, no evidence of injury.  The patient was placed in reverse 

Trendelenburg, airplaned to the left and 3 other ports were placed 

including 12 mm epigastric, two 5 mm right subcostals.  The fundus was

attempted to be grasped, but it was very indurated and distended, 

therefore, an endo-needle was used to aspirate.  Thick bile was 

removed, approximately 60 mL The gallbladder fundus was grasped and 

retracted cephalad.  Infundibulum was identified, dissected out of the

cystic duct and cystic artery.  There were a couple of adhesions that 

were attached to the gallbladder that were taken down via a hook 

electrocautery.  Gallbladder artery was dissected out with [place] 

and electro Bovie cautery.  Two clips were placed proximal and 1 

distal on both the cystic duct and cystic artery.  A small clip placed

on side branch going into the gallbladder.  Endo Osorio were used to 

transect the cystic duct and cystic artery.  Hook electrocautery was 

used to transect the gallbladder from the gallbladder fossa.  The 

gallbladder was noted to be thick and edematous with a very 

significant indurated plane.  Gallbladder was placed in the Endo Catch

bag and removed through the epigastric port.  Suction irrigation was 

used until effluent was clear and no evidence of spillage of bile.  

Hemostasis was obtained.  Pneumoperitoneum was removed.  All ports 

were removed.  The epigastric port was closed with 0 Vicryl.  The 

ports were closed with 4-0 Monocryl subcuticular suture.  Sterile 

dressing was then placed.  The patient tolerated the procedure well 

and there was no intraoperative complication.  All lap and instrument 

counts were correct at the end of the procedure.  The patient was 

extubated and taken stable to PACU.

 

 

__________________________________

MD NESTOR Fay//farzaneh

D: 03/28/2018, 05:03 PM

T: 03/28/2018, 05:24 PM

Visit #: [id number]

Job #: [id number]

## 2018-03-28 NOTE — RADRPT
EXAM DATE/TIME:  2018 10:38 

 

This report includes an Addendum and supersedes previous reports for this exam.

 

 

 

[facility] COMPARISON:     

US ABDOMEN - GALLBLADDER, 2018, 7:39.

 

INDICATIONS :     *** Abdominal pain with nausea.

                   

DOSE:      4.1 mCi Tc99m Mebrofenin IV 

                   

                   

MEDICAL HISTORY :        Gallstones.

SURGICAL HISTORY :       section.    

ENCOUNTER:     Initial

ACUITY:     1 day

PAIN SCALE:     8/10

LOCATION:      Right upper quadrant 

 

TECHNIQUE:     Following the intravenous administration of radiotracer, dynamic sequential images wer
e performed with continuous acquisition.

 

FINDINGS:     

HEPATIC KINETICS:     There is prompt uptake of radiotracer in the liver.  No focal defects are seen.
  There is normal rate of washout from the hepatic parenchyma.

 

BILIARY CLEARANCE:     Activity is first seen in the extrahepatic biliary system at 15 minutes.  Ther
e is normal excretion into the small bowel.

 

GALLBLADDER:     There is nonvisualization of the gallbladder at 90 minutes indicating cystic duct ob
struction/acute cholecystitis. Common bile duct kinetics are normal and there is no evidence of bilia
ry obstruction.

 

BILIARY ENTRIC REFLUX:     None observed.

 

CONCLUSION:     Non-visualization of the gallbladder at 90 minutes indicating cystic duct obstruction
/acute cholecystitis. Clinical correlation is recommended 

 

 Karl Albert MD on 2018 at 12:39           

Board Certified Radiologist.

 This report was verified electronically. 

 

ADDENDUM: 

 

COMPARISON:     CT ABDOMEN & PELVIS W/O CONTRAST, 2018, 6:20.

 

Delayed 22 hour scanning does reveal gallbladder activity. The appearance would be consistent with po
ssible chronic cholecystitis, however not acute cholecystitis.

 

 Marquis Yu MD on 2018 at 10:27           

Board Certified Radiologist.

 This report was verified electronically.

## 2018-03-29 VITALS
HEART RATE: 76 BPM | SYSTOLIC BLOOD PRESSURE: 102 MMHG | RESPIRATION RATE: 16 BRPM | TEMPERATURE: 98.8 F | OXYGEN SATURATION: 97 % | DIASTOLIC BLOOD PRESSURE: 64 MMHG

## 2018-03-29 VITALS
SYSTOLIC BLOOD PRESSURE: 108 MMHG | RESPIRATION RATE: 16 BRPM | TEMPERATURE: 98 F | HEART RATE: 75 BPM | DIASTOLIC BLOOD PRESSURE: 62 MMHG | OXYGEN SATURATION: 98 %

## 2018-03-29 LAB
ALBUMIN SERPL-MCNC: 2.5 GM/DL (ref 3.4–5)
ALP SERPL-CCNC: 112 U/L (ref 45–117)
ALT SERPL-CCNC: 40 U/L (ref 10–53)
AST SERPL-CCNC: 37 U/L (ref 15–37)
BASOPHILS # BLD AUTO: 0 TH/MM3 (ref 0–0.2)
BASOPHILS NFR BLD: 0.2 % (ref 0–2)
BILIRUB SERPL-MCNC: 0.4 MG/DL (ref 0.2–1)
BUN SERPL-MCNC: 5 MG/DL (ref 7–18)
CALCIUM SERPL-MCNC: 8 MG/DL (ref 8.5–10.1)
CHLORIDE SERPL-SCNC: 111 MEQ/L (ref 98–107)
CREAT SERPL-MCNC: 0.67 MG/DL (ref 0.5–1)
EOSINOPHIL # BLD: 0 TH/MM3 (ref 0–0.4)
EOSINOPHIL NFR BLD: 0.1 % (ref 0–4)
ERYTHROCYTE [DISTWIDTH] IN BLOOD BY AUTOMATED COUNT: 13.4 % (ref 11.6–17.2)
GFR SERPLBLD BASED ON 1.73 SQ M-ARVRAT: 102 ML/MIN (ref 89–?)
GLUCOSE SERPL-MCNC: 93 MG/DL (ref 74–106)
HBA1C MFR BLD: 5.3 % (ref 4.3–6)
HCO3 BLD-SCNC: 23.6 MEQ/L (ref 21–32)
HCT VFR BLD CALC: 30.5 % (ref 35–46)
HGB BLD-MCNC: 10.2 GM/DL (ref 11.6–15.3)
INR PPP: 1 RATIO
LYMPHOCYTES # BLD AUTO: 1.5 TH/MM3 (ref 1–4.8)
LYMPHOCYTES NFR BLD AUTO: 15.7 % (ref 9–44)
MAGNESIUM SERPL-MCNC: 1.9 MG/DL (ref 1.5–2.5)
MCH RBC QN AUTO: 28.5 PG (ref 27–34)
MCHC RBC AUTO-ENTMCNC: 33.6 % (ref 32–36)
MCV RBC AUTO: 84.8 FL (ref 80–100)
MONOCYTE #: 0.6 TH/MM3 (ref 0–0.9)
MONOCYTES NFR BLD: 6 % (ref 0–8)
NEUTROPHILS # BLD AUTO: 7.2 TH/MM3 (ref 1.8–7.7)
NEUTROPHILS NFR BLD AUTO: 78 % (ref 16–70)
PHOSPHATE SERPL-MCNC: 3.7 MG/DL (ref 2.5–4.9)
PLATELET # BLD: 206 TH/MM3 (ref 150–450)
PMV BLD AUTO: 9.5 FL (ref 7–11)
PROT SERPL-MCNC: 5.9 GM/DL (ref 6.4–8.2)
PROTHROMBIN TIME: 10 SEC (ref 9.8–11.6)
RBC # BLD AUTO: 3.59 MIL/MM3 (ref 4–5.3)
SODIUM SERPL-SCNC: 143 MEQ/L (ref 136–145)
T4 FREE SERPL-MCNC: 1.22 NG/DL (ref 0.76–1.46)
WBC # BLD AUTO: 9.3 TH/MM3 (ref 4–11)

## 2018-03-29 RX ADMIN — Medication SCH ML: at 08:55

## 2018-03-29 RX ADMIN — STANDARDIZED SENNA CONCENTRATE AND DOCUSATE SODIUM SCH TAB: 8.6; 5 TABLET, FILM COATED ORAL at 08:54

## 2018-03-29 RX ADMIN — OXYCODONE HYDROCHLORIDE AND ACETAMINOPHEN PRN TAB: 5; 325 TABLET ORAL at 08:55

## 2018-03-29 RX ADMIN — PHENYTOIN SODIUM SCH MLS/HR: 50 INJECTION INTRAMUSCULAR; INTRAVENOUS at 00:32

## 2018-03-29 RX ADMIN — TAZOBACTAM SODIUM AND PIPERACILLIN SODIUM SCH MLS/HR: 375; 3 INJECTION, SOLUTION INTRAVENOUS at 13:00

## 2018-03-29 RX ADMIN — FAMOTIDINE SCH MG: 10 INJECTION, SOLUTION INTRAVENOUS at 08:55

## 2018-03-29 RX ADMIN — TAZOBACTAM SODIUM AND PIPERACILLIN SODIUM SCH MLS/HR: 375; 3 INJECTION, SOLUTION INTRAVENOUS at 00:30

## 2018-03-29 RX ADMIN — TAZOBACTAM SODIUM AND PIPERACILLIN SODIUM SCH MLS/HR: 375; 3 INJECTION, SOLUTION INTRAVENOUS at 06:08

## 2018-03-29 RX ADMIN — OXYCODONE HYDROCHLORIDE AND ACETAMINOPHEN PRN TAB: 5; 325 TABLET ORAL at 00:31

## 2018-03-29 RX ADMIN — PHENYTOIN SODIUM SCH MLS/HR: 50 INJECTION INTRAMUSCULAR; INTRAVENOUS at 12:45

## 2018-03-29 NOTE — HHI.PR
Subjective


Remarks


Patient is a 32-year-old female who presented to the emergency department with 

abdominal pain.  Patient states that she has a history of gallstones and prior 

biliary colic diagnosed via ultrasound.  Patient is having worsening right 

upper quadrant pain.  Which is referring to the epigastric area.  She has some 

sharp pain that comes and goes has had some nausea with it but no vomiting


Patient has poor oral intake.


Denies any urinary symptoms.


Denies any fever chills or sweats








We will consult gastroenterology and surgery and placed in observation





3-29 had laparoscopic cholecystectomy yesterday


If tolerates diet will discharge to home today


Discussed with surgery and RN and patient





Objective


Vitals





Vital Signs








  Date Time  Temp Pulse Resp B/P (MAP) Pulse Ox O2 Delivery O2 Flow Rate FiO2


 


3/29/18 08:00 98.8 76 16 102/64 (77) 97   


 


3/28/18 20:00 97.9 82 20 122/71 (88) 97   


 


3/28/18 17:05 97.5 77 16 117/65 (82) 97   


 


3/28/18 16:45 98.2 77 15 104/63 (77) 98 Room Air  


 


3/28/18 16:30  72 12 105/61 (76) 96 Room Air  


 


3/28/18 16:15  78 13 123/67 (85) 98 Room Air  


 


3/28/18 16:00  91 14 123/64 (83) 100 Nasal Cannula 2 


 


3/28/18 15:53 98.0 101 14 128/62 (84) 100 Nasal Cannula 2 


 


3/28/18 12:39 97.7 60 18 143/70 (94) 100   














I/O      


 


 3/28/18 3/28/18 3/28/18 3/29/18 3/29/18 3/29/18





 07:00 15:00 23:00 07:00 15:00 23:00


 


Intake Total  1100 ml 1000 ml   


 


Output Total   5 ml   


 


Balance  1100 ml 995 ml   


 


      


 


Intake IV Total  1100 ml 1000 ml   


 


Output Estimated Blood Loss   5 ml   








Result Diagram:  


3/29/18 0701                                                                   

             3/29/18 0701





Other Results





Laboratory Tests








Test


  3/29/18


04:01 3/29/18


07:01


 


Prothrombin Time 10.0 SEC  


 


Prothromb Time International


Ratio 1.0 RATIO 


  


 


 


White Blood Count  9.3 TH/MM3 


 


Red Blood Count  3.59 MIL/MM3 


 


Hemoglobin  10.2 GM/DL 


 


Hematocrit  30.5 % 


 


Mean Corpuscular Volume  84.8 FL 


 


Mean Corpuscular Hemoglobin  28.5 PG 


 


Mean Corpuscular Hemoglobin


Concent 


  33.6 % 


 


 


Red Cell Distribution Width  13.4 % 


 


Platelet Count  206 TH/MM3 


 


Mean Platelet Volume  9.5 FL 


 


Neutrophils (%) (Auto)  78.0 % 


 


Lymphocytes (%) (Auto)  15.7 % 


 


Monocytes (%) (Auto)  6.0 % 


 


Eosinophils (%) (Auto)  0.1 % 


 


Basophils (%) (Auto)  0.2 % 


 


Neutrophils # (Auto)  7.2 TH/MM3 


 


Lymphocytes # (Auto)  1.5 TH/MM3 


 


Monocytes # (Auto)  0.6 TH/MM3 


 


Eosinophils # (Auto)  0.0 TH/MM3 


 


Basophils # (Auto)  0.0 TH/MM3 


 


CBC Comment  DIFF FINAL 


 


Differential Comment   


 


Blood Urea Nitrogen  5 MG/DL 


 


Creatinine  0.67 MG/DL 


 


Random Glucose  93 MG/DL 


 


Total Protein  5.9 GM/DL 


 


Albumin  2.5 GM/DL 


 


Calcium Level  8.0 MG/DL 


 


Phosphorus Level  3.7 MG/DL 


 


Magnesium Level  1.9 MG/DL 


 


Alkaline Phosphatase  112 U/L 


 


Aspartate Amino Transf


(AST/SGOT) 


  37 U/L 


 


 


Alanine Aminotransferase


(ALT/SGPT) 


  40 U/L 


 


 


Total Bilirubin  0.4 MG/DL 


 


Sodium Level  143 MEQ/L 


 


Potassium Level  3.6 MEQ/L 


 


Chloride Level  111 MEQ/L 


 


Carbon Dioxide Level  23.6 MEQ/L 


 


Anion Gap  8 MEQ/L 


 


Estimat Glomerular Filtration


Rate 


  102 ML/MIN 


 


 


Amylase Level  51 U/L 


 


Lipase  118 U/L 


 


Free Thyroxine  1.22 NG/DL 


 


Thyroid Stimulating Hormone


3rd Gen 


  1.690 uIU/ML 


 








Imaging





Last Impressions








Abdomen/Pelvis CT 3/28/18 0542 Signed





Impressions: 





 Service Date/Time:  Wednesday, March 28, 2018 06:20 - CONCLUSION:  1. No renal 





 calculi or hydronephrosis. 2. Normal appendix. 3. Cholelithiasis.     Adalberto Neri MD 


 


Hepatobiliary Scan Nuclear Medicine 3/28/18 0000 Signed





Impressions: 





 Service Date/Time:  Wednesday, March 28, 2018 10:38 - CONCLUSION: 





 Non-visualization of the gallbladder at 90 minutes indicating cystic duct 





 obstruction/acute cholecystitis. Clinical correlation is recommended    Karl Albert MD ADDENDUM:   COMPARISON:     CT ABDOMEN & PELVIS W/O CONTRAST, 

March 28, 2018, 6:20.  Delayed 22 hour scanning does reveal gallbladder activity. 

The 





 appearance would be consistent with possible chronic cholecystitis, however 

not 





 acute cholecystitis.   Marquis Yu MD 


 


Gall Bladder Ultrasound 3/28/18 0000 Signed





Impressions: 





 Service Date/Time:  Wednesday, March 28, 2018 07:39 - CONCLUSION:  1. Sludge 

and 





 gallstone lodged within the neck of the gallbladder with sonographic findings 





 suggesting acute cholecystitis. HIDA scan could be performed to evaluate the 





 patency of the cystic duct. 2. Common bile duct is long-term stable in size.  

   





 Benjamin Jones Jr., MD 








Objective Remarks


GENERAL: Alert and oriented 3 talkative and cooperative appears to be in no 

acute distress


SKIN: Warm and dry.


HEAD: Atraumatic. Normocephalic. 


EYES: Pupils equal and round. No scleral icterus. No injection or drainage.  

Extraocular muscles are intact


ENT: No nasal bleeding or discharge.  Mucous membranes pink and moist.  Tongue 

is midline


NECK: Trachea midline. No JVD.  Neck is supple


CARDIOVASCULAR: Regular rate and rhythm.  S1-S2 no S3 or S4


RESPIRATORY: No accessory muscle use. Clear to auscultation. Breath sounds 

equal bilaterally. 


GASTROINTESTINAL: Abdomen soft, non-tender, nondistended. Hepatic and splenic 

margins not palpable.  Very mild tenderness


MUSCULOSKELETAL: Extremities without clubbing, cyanosis, or edema. No obvious 

deformities. 


NEUROLOGICAL: Awake and alert. No obvious cranial nerve deficits.  Motor 

grossly within normal limits. Five out of 5 muscle strength in the arms and 

legs.  Normal speech.


PSYCHIATRIC: Appropriate mood and affect; insight and judgment normal.


Procedures


DATE OF OPERATION:


03/28/2018


 


PREOPERATIVE DIAGNOSIS:


Acute cholecystitis with cholelithiasis.


 


POSTOPERATIVE DIAGNOSIS:


Acute cholecystitis with cholelithiasis.


 


PROCEDURE PERFORMED:


Laparoscopic cholecystectomy.


 


SURGEON:


Adan Sow MD


 


ASSISTANT:


See OR sheet.


 


ANESTHESIA:


GETA.


 


IV FLUIDS:


See anesthesia sheet.


 


ESTIMATED BLOOD LOSS:


5 mL.


 


DRAINS:


None.


 


COMPLICATIONS:


None.


 


WOUND CLASSIFICATION:


Contaminated.


 


SPECIMENS:


Gallbladder.


 


INDICATIONS:


The patient is a 32-year-old female who has a history of 


cholelithiasis.  She came with acute onset of pain since 10:00 p.m., 


right upper quadrant, and CT and ultrasound findings of acute 


cholecystitis with cholelithiasis. Therefore, decision was made for 


operative intervention.


 


DETAILS OF PROCEDURE:


The patient was taken to the operative suite, placed in supine 


position.  She was prepped and draped in usual sterile fashion after 


induction of general endotracheal anesthesia.  Brief timeout done 


stating correct patient, procedure, and surgical site.  We were all in


agreement with this.


 


Attention was directed to the umbilicus where a local anesthetic was 


injected.  A stab nick incision was made with an 11 blade.  The Veress


needle was placed.  Intraabdominal placement confirmed with saline 


drop test.  The abdomen insufflated to 15 mm pneumoperitoneum.  The 5 


mm Visiport Optiview was used to enter the abdomen safely.  On cursory


inspection, no evidence of injury.  The patient was placed in reverse 


Trendelenburg, airplaned to the left and 3 other ports were placed 


including 12 mm epigastric, two 5 mm right subcostals.  The fundus was


attempted to be grasped, but it was very indurated and distended, 


therefore, an endo-needle was used to aspirate.  Thick bile was 


removed, approximately 60 mL The gallbladder fundus was grasped and 


retracted cephalad.  Infundibulum was identified, dissected out of the


cystic duct and cystic artery.  There were a couple of adhesions that 


were attached to the gallbladder that were taken down via a hook 


electrocautery.  Gallbladder artery was dissected out with [place] 


and electro Bovie cautery.  Two clips were placed proximal and 1 


distal on both the cystic duct and cystic artery.  A small clip placed


on side branch going into the gallbladder.  Endo Osorio were used to 


transect the cystic duct and cystic artery.  Hook electrocautery was 


used to transect the gallbladder from the gallbladder fossa.  The 


gallbladder was noted to be thick and edematous with a very 


significant indurated plane.  Gallbladder was placed in the Endo Catch


bag and removed through the epigastric port.  Suction irrigation was 


used until effluent was clear and no evidence of spillage of bile.  


Hemostasis was obtained.  Pneumoperitoneum was removed.  All ports 


were removed.  The epigastric port was closed with 0 Vicryl.  The 


ports were closed with 4-0 Monocryl subcuticular suture.  Sterile 


dressing was then placed.  The patient tolerated the procedure well 


and there was no intraoperative complication.  All lap and instrument 


counts were correct at the end of the procedure.  The patient was 


extubated and taken stable to PACU.


 


 


__________________________________


Adan Sow MD


Medications and IVs





Current Medications


Morphine Sulfate (Morphine Inj) 2 mg ONCE  ONCE IV PUSH  Last administered on 3/

28/18at 06:03;  Start 3/28/18 at 05:45;  Stop 3/28/18 at 05:46;  Status DC


Ondansetron HCl (Zofran Inj) 4 mg ONCE  ONCE IVP  Last administered on 3/28/

18at 06:02;  Start 3/28/18 at 05:45;  Stop 3/28/18 at 05:46;  Status DC


Sodium Chloride 1,000 ml @  1,000 mls/hr Q1H IV  Last administered on 3/28/18at 

05:42;  Start 3/28/18 at 05:42;  Stop 3/28/18 at 06:41;  Status DC


Sodium Chloride (NS Flush) 2 ml UNSCH  PRN IV FLUSH FLUSH AFTER USING IV ACCESS 

Last administered on 3/28/18at 12:54;  Start 3/28/18 at 05:45


Piperacillin Sod/ Tazobactam Sod 50 ml @  100 mls/hr ONCE  ONCE IV  Last 

administered on 3/28/18at 06:41;  Start 3/28/18 at 06:15;  Stop 3/28/18 at 06:44

;  Status DC


Sodium Chloride 1,000 ml @  100 mls/hr Q10H IV  Last administered on 3/29/18at 

00:32;  Start 3/28/18 at 06:45


Sodium Chloride (NS Flush) 2 ml UNSCH  PRN IV FLUSH FLUSH AFTER USING IV ACCESS

;  Start 3/28/18 at 06:45


Sodium Chloride (NS Flush) 2 ml BID IV FLUSH  Last administered on 3/29/18at 08:

55;  Start 3/28/18 at 09:00


Ondansetron HCl (Zofran Inj) 4 mg Q6H  PRN IVP NAUSEA OR VOMITING Last 

administered on 3/28/18at 12:54;  Start 3/28/18 at 12:00


Naloxone HCl (Narcan Inj) 0.4 mg UNSCH  PRN IV PUSH SEE LABEL COMMENTS;  Start 3

/28/18 at 06:45


Piperacillin Sod/ Tazobactam Sod 50 ml @  100 mls/hr Q6H IV  Last administered 

on 3/29/18at 06:08;  Start 3/28/18 at 13:00


Morphine Sulfate (Morphine Inj) 2 mg Q3H  PRN IV PUSH pain > 4 Last 

administered on 3/28/18at 12:53;  Start 3/28/18 at 09:00


Ketorolac Tromethamine (Toradol Inj) 30 mg Q6H  PRN IV PUSH 6-10 PAIN AND NPO;  

Start 3/28/18 at 09:45;  Stop 4/2/18 at 09:44;  Status Cancel


Famotidine (Pepcid Inj) 20 mg Q12HR IV PUSH  Last administered on 3/29/18at 08:

55;  Start 3/28/18 at 11:15;  Stop 3/29/18 at 10:10;  Status DC


Naloxone HCl (Narcan Inj) 0.4 mg UNSCH  PRN IV PUSH SEE LABEL COMMENTS;  Start 3

/28/18 at 11:30;  Stop 3/28/18 at 11:49;  Status DC


Senna/Docusate Sodium (Joan-Colace) 1 tab BID PO  Last administered on 3/29/

18at 08:54;  Start 3/28/18 at 21:00


Magnesium Hydroxide (Milk Of Magnesia Liq) 30 ml Q12H  PRN PO Mild constipation

;  Start 3/28/18 at 11:30


Sennosides (Senokot) 17.2 mg Q12H  PRN PO Moderate constipation;  Start 3/28/18 

at 11:30


Bisacodyl (Dulcolax Supp) 10 mg DAILY  PRN RECTAL SEVERE CONSITIPATION;  Start 3

/28/18 at 11:30


Lactulose (Lactulose Liq) 30 ml DAILY  PRN PO SEVERE CONSITIPATION;  Start 3/28/

18 at 11:30


Bupivacaine HCl/ Epinephrine Bitart (Sensorcaine-Epinephrine 0.25% Inj) 50 ml 

STK-MED ONCE .ROUTE  Last administered on 3/28/18at 14:57;  Start 3/28/18 at 13:

46;  Stop 3/28/18 at 13:47;  Status DC


Midazolam HCl (Versed Inj) 2 mg STK-MED ONCE .ROUTE ;  Start 3/28/18 at 13:52;  

Stop 3/28/18 at 13:53;  Status DC


Fentanyl Citrate (fentaNYL INJ) 250 mcg STK-MED ONCE .ROUTE ;  Start 3/28/18 at 

13:52;  Stop 3/28/18 at 13:53;  Status DC


Acetaminophen 100 ml @  As Directed STK-MED ONCE IV ;  Start 3/28/18 at 13:52;  

Stop 3/28/18 at 13:53;  Status DC


Oxycodone/ Acetaminophen (Percocet  5-325 Mg) 2 tab Q4H  PRN PO pain scale 1-5 

Last administered on 3/29/18at 08:55;  Start 3/28/18 at 15:45


Fentanyl Citrate (fentaNYL INJ) 100 mcg STK-MED ONCE .ROUTE ;  Start 3/28/18 at 

16:01;  Stop 3/28/18 at 16:02;  Status DC


Miscellaneous Information ALL NURSING DEPARTME... UNSCH  PRN .XX SEE LABEL 

COMMENTS;  Start 3/28/18 at 15:55;  Stop 3/29/18 at 15:54


Famotidine (Pepcid) 20 mg BID PO ;  Start 3/29/18 at 21:00





A/P


Problem List:  


(1) Cholelithiasis


ICD Code:  K80.20 - Calculus of gallbladder without cholecystitis without 

obstruction


Status:  Acute


Assessment and Plan


Abdominal pain with possible acute cholecystitis will consult surgery as well 

as gastroenterology


We will get a HIDA scan which was positive


Pain control with nonnarcotics due to need for HIDA scan





Suspected cholecystitis pain control discussed with gastroenterology





Leukocytosis continue on antibiotics and pain control and fluids keep n.p.o. at 

this time


Continue on DVT prophylaxis with SCDs at this time


GI prophylaxis with Pepcid





Status post cholecystectomy laparoscopically on March 28


If tolerates diet will be discharged home later today discussed with surgery 

and RN and patient


Discharge Planning


If tolerates a diet











Momo Mireles DO Mar 29, 2018 11:07

## 2018-03-29 NOTE — HHI.DS
__________________________________________________





Discharge Summary


Admission Date


Mar 28, 2018 at 06:50


Discharge Date:  Mar 29, 2018


Admitting Diagnosis





Acute Cholecystitis





(1) Cholelithiasis


ICD Code:  K80.20 - Calculus of gallbladder without cholecystitis without 

obstruction


Diagnosis:  Principal


Status:  Acute


(2) Abdominal pain


ICD Code:  R10.9 - Unspecified abdominal pain


Diagnosis:  Principal





Procedures


DATE OF OPERATION:


03/28/2018


 


PREOPERATIVE DIAGNOSIS:


Acute cholecystitis with cholelithiasis.


 


POSTOPERATIVE DIAGNOSIS:


Acute cholecystitis with cholelithiasis.


 


PROCEDURE PERFORMED:


Laparoscopic cholecystectomy.


 


SURGEON:


Adan Sow MD


 


ASSISTANT:


See OR sheet.


 


ANESTHESIA:


GETA.


 


IV FLUIDS:


See anesthesia sheet.


 


ESTIMATED BLOOD LOSS:


5 mL.


 


DRAINS:


None.


 


COMPLICATIONS:


None.


 


WOUND CLASSIFICATION:


Contaminated.


 


SPECIMENS:


Gallbladder.


 


INDICATIONS:


The patient is a 32-year-old female who has a history of 


cholelithiasis.  She came with acute onset of pain since 10:00 p.m., 


right upper quadrant, and CT and ultrasound findings of acute 


cholecystitis with cholelithiasis. Therefore, decision was made for 


operative intervention.


 


DETAILS OF PROCEDURE:


The patient was taken to the operative suite, placed in supine 


position.  She was prepped and draped in usual sterile fashion after 


induction of general endotracheal anesthesia.  Brief timeout done 


stating correct patient, procedure, and surgical site.  We were all in


agreement with this.


 


Attention was directed to the umbilicus where a local anesthetic was 


injected.  A stab nick incision was made with an 11 blade.  The Veress


needle was placed.  Intraabdominal placement confirmed with saline 


drop test.  The abdomen insufflated to 15 mm pneumoperitoneum.  The 5 


mm Visiport Optiview was used to enter the abdomen safely.  On cursory


inspection, no evidence of injury.  The patient was placed in reverse 


Trendelenburg, airplaned to the left and 3 other ports were placed 


including 12 mm epigastric, two 5 mm right subcostals.  The fundus was


attempted to be grasped, but it was very indurated and distended, 


therefore, an endo-needle was used to aspirate.  Thick bile was 


removed, approximately 60 mL The gallbladder fundus was grasped and 


retracted cephalad.  Infundibulum was identified, dissected out of the


cystic duct and cystic artery.  There were a couple of adhesions that 


were attached to the gallbladder that were taken down via a hook 


electrocautery.  Gallbladder artery was dissected out with [place] 


and electro Bovie cautery.  Two clips were placed proximal and 1 


distal on both the cystic duct and cystic artery.  A small clip placed


on side branch going into the gallbladder.  Endo Osorio were used to 


transect the cystic duct and cystic artery.  Hook electrocautery was 


used to transect the gallbladder from the gallbladder fossa.  The 


gallbladder was noted to be thick and edematous with a very 


significant indurated plane.  Gallbladder was placed in the Endo Catch


bag and removed through the epigastric port.  Suction irrigation was 


used until effluent was clear and no evidence of spillage of bile.  


Hemostasis was obtained.  Pneumoperitoneum was removed.  All ports 


were removed.  The epigastric port was closed with 0 Vicryl.  The 


ports were closed with 4-0 Monocryl subcuticular suture.  Sterile 


dressing was then placed.  The patient tolerated the procedure well 


and there was no intraoperative complication.  All lap and instrument 


counts were correct at the end of the procedure.  The patient was 


extubated and taken stable to PACU.


 


 


__________________________________


Adan Sow MD


Brief History - From Admission


Patient is a 32-year-old female who presented to the emergency department with 

abdominal pain.  Patient states that she has a history of gallstones and prior 

biliary colic diagnosed via ultrasound.  Patient is having worsening right 

upper quadrant pain.  Which is referring to the epigastric area.  She has some 

sharp pain that comes and goes has had some nausea with it but no vomiting


Patient has poor oral intake.


Denies any urinary symptoms.


Denies any fever chills or sweats








We will consult gastroenterology and surgery and placed in observation


CBC/BMP:  


3/29/18 0701                                                                   

             3/29/18 0701





Significant Findings





Laboratory Tests








Test


  3/28/18


05:50 3/29/18


04:01 3/29/18


07:01


 


White Blood Count


  15.8 TH/MM3


(4.0-11.0) 


  


 


 


Neutrophils (%) (Auto)


  82.2 %


(16.0-70.0) 


  78.0 %


(16.0-70.0)


 


Neutrophils # (Auto)


  13.0 TH/MM3


(1.8-7.7) 


  


 


 


Urine Bacteria


  RARE /hpf


(NONE) 


  


 


 


Urine Mucus FEW /lpf (OCC)   


 


Total Protein


  8.8 GM/DL


(6.4-8.2) 


  5.9 GM/DL


(6.4-8.2)


 


Alkaline Phosphatase


  172 U/L


() 


  


 


 


Red Blood Count


  


  


  3.59 MIL/MM3


(4.00-5.30)


 


Hemoglobin


  


  


  10.2 GM/DL


(11.6-15.3)


 


Hematocrit


  


  


  30.5 %


(35.0-46.0)


 


Blood Urea Nitrogen   5 MG/DL (7-18) 


 


Albumin


  


  


  2.5 GM/DL


(3.4-5.0)


 


Calcium Level


  


  


  8.0 MG/DL


(8.5-10.1)


 


Chloride Level


  


  


  111 MEQ/L


()








Imaging





Last Impressions








Abdomen/Pelvis CT 3/28/18 0542 Signed





Impressions: 





 Service Date/Time:  Wednesday, March 28, 2018 06:20 - CONCLUSION:  1. No renal 





 calculi or hydronephrosis. 2. Normal appendix. 3. Cholelithiasis.     Adalberto Neri MD 


 


Hepatobiliary Scan Nuclear Medicine 3/28/18 0000 Signed





Impressions: 





 Service Date/Time:  Wednesday, March 28, 2018 10:38 - CONCLUSION: 





 Non-visualization of the gallbladder at 90 minutes indicating cystic duct 





 obstruction/acute cholecystitis. Clinical correlation is recommended    Karl Albert MD ADDENDUM:   COMPARISON:     CT ABDOMEN & PELVIS W/O CONTRAST, 

March 28, 2018, 6:20.  Delayed 22 hour scanning does reveal gallbladder activity. 

The 





 appearance would be consistent with possible chronic cholecystitis, however 

not 





 acute cholecystitis.   Marqius Yu MD 


 


Gall Bladder Ultrasound 3/28/18 0000 Signed





Impressions: 





 Service Date/Time:  Wednesday, March 28, 2018 07:39 - CONCLUSION:  1. Sludge 

and 





 gallstone lodged within the neck of the gallbladder with sonographic findings 





 suggesting acute cholecystitis. HIDA scan could be performed to evaluate the 





 patency of the cystic duct. 2. Common bile duct is long-term stable in size.  

   





 Benjamin Jones Jr., MD 








PE at Discharge


GENERAL: Alert and oriented 3 talkative and cooperative appears to be in no 

acute distress


SKIN: Warm and dry.


HEAD: Atraumatic. Normocephalic. 


EYES: Pupils equal and round. No scleral icterus. No injection or drainage.  

Extraocular muscles are intact


ENT: No nasal bleeding or discharge.  Mucous membranes pink and moist.  Tongue 

is midline


NECK: Trachea midline. No JVD.  Neck is supple


CARDIOVASCULAR: Regular rate and rhythm.  S1-S2 no S3 or S4


RESPIRATORY: No accessory muscle use. Clear to auscultation. Breath sounds 

equal bilaterally. 


GASTROINTESTINAL: Abdomen soft, non-tender, nondistended. Hepatic and splenic 

margins not palpable.  Very mild tenderness


MUSCULOSKELETAL: Extremities without clubbing, cyanosis, or edema. No obvious 

deformities. 


NEUROLOGICAL: Awake and alert. No obvious cranial nerve deficits.  Motor 

grossly within normal limits. Five out of 5 muscle strength in the arms and 

legs.  Normal speech.


PSYCHIATRIC: Appropriate mood and affect; insight and judgment normal.


Hospital Course


Patient is a 32-year-old female who presented to the emergency department with 

abdominal pain.  Patient states that she has a history of gallstones and prior 

biliary colic diagnosed via ultrasound.  Patient is having worsening right 

upper quadrant pain.  Which is referring to the epigastric area.  She has some 

sharp pain that comes and goes has had some nausea with it but no vomiting


Patient has poor oral intake.


Denies any urinary symptoms.


Denies any fever chills or sweats








We will consult gastroenterology and surgery and placed in observation





3-29 had laparoscopic cholecystectomy yesterday


If tolerates diet will discharge to home today


Discussed with surgery and RN and patient


Pt Condition on Discharge:  Good


Discharge Disposition:  Discharge Home


Discharge Time:  <= 30 minutes


Discharge Instructions


DIET: Follow Instructions for:  Heart Healthy Diet


Speech Therapy-Diet Recommends:  Regular


Activities you can perform:  Shower Only-No Bath


Other Activity Instructions:  


NO TUB BATHS OR SWIMMING


Follow up Referrals:  


PCP Follow-up - 1 Week with Marquis Padgett MD


Surgical - 1 Week with Adan Sow MD





New Medications:  


Ondansetron Odt (Zofran Odt) 8 Mg Tab


8 MG SL Q8HR for Nausea/Vomiting, #28 TAB 0 Refills





Oxycodone HCl/Acetaminophen (Oxycodone-Acetaminophen 5-325) 5 Mg-325 Mg Tablet


1 TAB PO Q4H PRN for pain, #20 TAB





Sennosides-Docusate Sodium (Gnp Senna Plus 8.6-50 mg) 8.6 Mg-50 Mg Tab


1 TAB PO BID for Constipation, #60 TAB





 


Continued Medications:  


Norgestimate-Ethinyl Estradiol (Tri-Sprintec) 0.18/0.215/0.25 Mg-35 Mcg Tab




















Momo Mireles DO Mar 29, 2018 11:13

## 2018-03-29 NOTE — HHI.PR
cc:   Adan Sow MD


__________________________________________________





Subjective


Subjective Notes


Resting in bed 


No issues 


Pain controlled





Objective


Vitals/I&O





Vital Signs








  Date Time  Temp Pulse Resp B/P (MAP) Pulse Ox O2 Delivery O2 Flow Rate FiO2


 


3/29/18 12:00 98.0 75 16 108/62 (77) 98   


 


3/28/18 16:45      Room Air  


 


3/28/18 16:00       2 








Labs





Laboratory Tests








Test


  3/29/18


04:01 3/29/18


07:01


 


Prothrombin Time 10.0  


 


Prothromb Time International


Ratio 1.0 


  


 


 


White Blood Count  9.3 


 


Red Blood Count  3.59 


 


Hemoglobin  10.2 


 


Hematocrit  30.5 


 


Mean Corpuscular Volume  84.8 


 


Mean Corpuscular Hemoglobin  28.5 


 


Mean Corpuscular Hemoglobin


Concent 


  33.6 


 


 


Red Cell Distribution Width  13.4 


 


Platelet Count  206 


 


Mean Platelet Volume  9.5 


 


Neutrophils (%) (Auto)  78.0 


 


Lymphocytes (%) (Auto)  15.7 


 


Monocytes (%) (Auto)  6.0 


 


Eosinophils (%) (Auto)  0.1 


 


Basophils (%) (Auto)  0.2 


 


Neutrophils # (Auto)  7.2 


 


Lymphocytes # (Auto)  1.5 


 


Monocytes # (Auto)  0.6 


 


Eosinophils # (Auto)  0.0 


 


Basophils # (Auto)  0.0 


 


CBC Comment  DIFF FINAL 


 


Differential Comment   


 


Blood Urea Nitrogen  5 


 


Creatinine  0.67 


 


Random Glucose  93 


 


Total Protein  5.9 


 


Albumin  2.5 


 


Calcium Level  8.0 


 


Phosphorus Level  3.7 


 


Magnesium Level  1.9 


 


Alkaline Phosphatase  112 


 


Aspartate Amino Transf


(AST/SGOT) 


  37 


 


 


Alanine Aminotransferase


(ALT/SGPT) 


  40 


 


 


Total Bilirubin  0.4 


 


Sodium Level  143 


 


Potassium Level  3.6 


 


Chloride Level  111 


 


Carbon Dioxide Level  23.6 


 


Anion Gap  8 


 


Estimat Glomerular Filtration


Rate 


  102 


 


 


Amylase Level  51 


 


Lipase  118 


 


Free Thyroxine  1.22 


 


Thyroid Stimulating Hormone


3rd Gen 


  1.690 


 








Cardiovascular:  Regular


Lungs:  Clear


Abdomen:  Other (lap sites c/d/i; umbilcus incision with minimal blood on 

bandage )


Extremities:  No edema





A/P


Assessment and Plan


32 year old female POD1 lap regi 





-Regular diet 


-Pain controlled


-OOB and mobilize


-Left rx for pain meds on chart 


-Okay to shower; avoid bathtubs and swimming pools


-Avoid heavy pushing pulling or lifting 


-GS clear for DC 


-Follow up Friday April 6th





Attending Statement


patient seen at bedside


no issue doing well


d/c planningThe exam, history, and the medical decision-making described in the 

above note were completed with the assistance of the mid-level provider. I 

reviewed and agree with the findings presented.  I attest that I had a face-to-

face encounter with the patient on the same day, and personally performed and 

documented my assessment and findings in the medical record.











Preeti Pham. SHERIDANP/[facility] ARNP Mar 29, 2018 12:49


Adan Sow MD Apr 3, 2018 13:51

## 2018-03-29 NOTE — MB
cc:

Adan Sow MD

****

 

 

DATE:

2018

 

REASON FOR CONSULTATION:

Abdominal pain, acute cholecystitis.

 

HISTORY OF PRESENT ILLNESS:

The patient is a 32-year-old female with a history of gallstones and 

abdominal pain for approximately one year.  The patient presented to 

the emergency department with acute onset right upper quadrant 

abdominal pain.  She states the pain started around 10 p.m. and began 

suddenly and also radiated to the epigastric area, worse with 

movement, better with lying still.  She denied any fevers.  She did 

say the pain is worse with meals.  She came to the emergency 

department.  Further evaluation including a CT scan and ultrasound 

showing acute cholecystitis with cholelithiasis.  Therefore, surgery 

consulted.

 

PAST MEDICAL HISTORY:

Gallstones.

 

PAST SURGICAL HISTORY:

.

 

ALLERGIES:

ERYTHROMYCIN.

 

SOCIAL HISTORY:

Occasional ETOH.  Denies smoking or IVDA.

 

MEDICATIONS:

See electronic medical record.

 

FAMILY HISTORY:

Denies diabetes or hypertension.

 

REVIEW OF SYSTEMS:

GENERAL:  Denies fever or chills.

HEENT:  Denies eye pain, ear.

NECK:  Denies swelling or pain.

LUNGS:  Denies cough or wheeze.

HEART:  Denies palpitation or chest pain.

ABDOMEN:  Complains of abdominal pain.  Denies nausea or vomiting.

GENITOURINARY:  Denies dysuria or hematuria.

ENDOCRINE:  Denies polyuria or polydipsia.

INTEGUMENT:  Denies masses or lesions.

NEUROLOGIC:  Denies numbness or tingling.

PSYCHIATRIC:  Denies change in mood or sensorium

 

PHYSICAL EXAM:

GENERAL:  The patient is in no acute distress.

VITAL SIGNS:  Temperature 98.3, pulse 68, respirations 18, blood 

pressure is 150/87, saturation 100 percent.

HEENT:  Pupils equal, round, and reactive.

NECK:  Supple.  Trachea midline.

LUNGS:  Clear to auscultation bilaterally expansion.

HEART:  S1, S2.  Regular rate and rhythm.

ABDOMEN:  Soft, positive tenderness to palpation in the right upper 

quadrant.  No be rebound or guarding.

EXTREMITIES:  Warm and well perfused.

NEUROLOGIC:  5/5 motor in all extremities.  GCS of 15.

 

LABORATORY AND DIAGNOSTIC DATA:

WBC 15.8, hemoglobin 13.2, hematocrit 40.1, platelets 260.

 

Sodium 139, potassium 3.7, chloride 103, BUN 8, creatinine 0.7, AST 

23, ALT 34, alkaline phosphatase is 172.  Lipase is 127.

 

IMAGING:

CT reviewed by myself.  CT abdomen and pelvis showing cholelithiasis, 

thickened gallbladder wall.

 

Ultrasound confirming gallstones and sludge suggestive of acute 

cholecystitis.

 

ASSESSMENT:

The patient is a 32-year-old female who presents with acute onset of 

right upper quadrant abdominal pain, known history of gallstones.

 

PLAN:

I had a full clinical, radiologic and laboratory work up for this 

patient's above-noted issues including acute cholecystitis.  At this 

point, the patient needs to be on IV antibiotics, NPO, pain control.  

We will plan for laparoscopic cholecystectomy.  Discussed with the 

patient in detail, stated understanding, agrees and would like to 

proceed.

 

 

 

 

__________________________________

MD NESTOR Fay/ZENA

D: 2018, 10:56 AM

T: 2018, 11:13 AM

Visit #: [id number]

Job #: [id number]

[email or web address]